# Patient Record
Sex: MALE | Race: WHITE
[De-identification: names, ages, dates, MRNs, and addresses within clinical notes are randomized per-mention and may not be internally consistent; named-entity substitution may affect disease eponyms.]

---

## 2019-04-13 ENCOUNTER — HOSPITAL ENCOUNTER (EMERGENCY)
Dept: HOSPITAL 10 - E/R | Age: 64
Discharge: HOME | End: 2019-04-13
Payer: COMMERCIAL

## 2019-04-13 ENCOUNTER — HOSPITAL ENCOUNTER (EMERGENCY)
Dept: HOSPITAL 91 - E/R | Age: 64
Discharge: HOME | End: 2019-04-13
Payer: COMMERCIAL

## 2019-04-13 VITALS — SYSTOLIC BLOOD PRESSURE: 156 MMHG | DIASTOLIC BLOOD PRESSURE: 87 MMHG | HEART RATE: 78 BPM | RESPIRATION RATE: 18 BRPM

## 2019-04-13 VITALS
WEIGHT: 161.16 LBS | WEIGHT: 161.16 LBS | HEIGHT: 65 IN | HEIGHT: 65 IN | BODY MASS INDEX: 26.85 KG/M2 | BODY MASS INDEX: 26.85 KG/M2

## 2019-04-13 DIAGNOSIS — G44.209: Primary | ICD-10-CM

## 2019-04-13 DIAGNOSIS — Z79.82: ICD-10-CM

## 2019-04-13 DIAGNOSIS — R19.7: ICD-10-CM

## 2019-04-13 LAB
ADD MAN DIFF?: NO
ANION GAP: 11 (ref 5–13)
BASOPHIL #: 0 10^3/UL (ref 0–0.1)
BASOPHILS %: 0.4 % (ref 0–2)
BLOOD UREA NITROGEN: 16 MG/DL (ref 7–20)
CALCIUM: 9.2 MG/DL (ref 8.4–10.2)
CARBON DIOXIDE: 24 MMOL/L (ref 21–31)
CHLORIDE: 102 MMOL/L (ref 97–110)
CREATININE: 0.73 MG/DL (ref 0.61–1.24)
EOSINOPHILS #: 0.1 10^3/UL (ref 0–0.5)
EOSINOPHILS %: 1.6 % (ref 0–7)
GLUCOSE: 115 MG/DL (ref 70–220)
HEMATOCRIT: 41.6 % (ref 42–52)
HEMOGLOBIN: 14.2 G/DL (ref 14–18)
IMMATURE GRANS #M: 0.01 10^3/UL (ref 0–0.03)
IMMATURE GRANS % (M): 0.2 % (ref 0–0.43)
LYMPHOCYTES #: 0.7 10^3/UL (ref 0.8–2.9)
LYMPHOCYTES %: 14.7 % (ref 15–51)
MEAN CORPUSCULAR HEMOGLOBIN: 29.7 PG (ref 29–33)
MEAN CORPUSCULAR HGB CONC: 34.1 G/DL (ref 32–37)
MEAN CORPUSCULAR VOLUME: 87 FL (ref 82–101)
MEAN PLATELET VOLUME: 9.3 FL (ref 7.4–10.4)
MONOCYTE #: 0.5 10^3/UL (ref 0.3–0.9)
MONOCYTES %: 9.7 % (ref 0–11)
NEUTROPHIL #: 3.7 10^3/UL (ref 1.6–7.5)
NEUTROPHILS %: 73.4 % (ref 39–77)
NUCLEATED RED BLOOD CELLS #: 0 10^3/UL (ref 0–0)
NUCLEATED RED BLOOD CELLS%: 0 /100WBC (ref 0–0)
PLATELET COUNT: 196 10^3/UL (ref 140–415)
POTASSIUM: 3.9 MMOL/L (ref 3.5–5.1)
RED BLOOD COUNT: 4.78 10^6/UL (ref 4.7–6.1)
RED CELL DISTRIBUTION WIDTH: 13 % (ref 11.5–14.5)
SODIUM: 137 MMOL/L (ref 135–144)
WHITE BLOOD COUNT: 5 10^3/UL (ref 4.8–10.8)

## 2019-04-13 PROCEDURE — 99284 EMERGENCY DEPT VISIT MOD MDM: CPT

## 2019-04-13 PROCEDURE — 96374 THER/PROPH/DIAG INJ IV PUSH: CPT

## 2019-04-13 PROCEDURE — 80048 BASIC METABOLIC PNL TOTAL CA: CPT

## 2019-04-13 PROCEDURE — 85025 COMPLETE CBC W/AUTO DIFF WBC: CPT

## 2019-04-13 PROCEDURE — 96361 HYDRATE IV INFUSION ADD-ON: CPT

## 2019-04-13 RX ADMIN — THIAMINE HYDROCHLORIDE 1 MLS/HR: 100 INJECTION, SOLUTION INTRAMUSCULAR; INTRAVENOUS at 16:12

## 2019-04-13 RX ADMIN — KETOROLAC TROMETHAMINE 1 MG: 15 INJECTION, SOLUTION INTRAMUSCULAR; INTRAVENOUS at 16:12

## 2019-04-13 NOTE — ERD
ER Documentation


Chief Complaint


Chief Complaint





BACK OF HEAD PAIN X 2 DAYS. ALSO C/O DIARRHEA. HTN IN TRIAGE





HPI


63-year-old gentleman who presents to the emergency room with a myriad of 


different complaints over approximately 2-week timeframe.  He describes 


intermittent loose watery stools, he has a gradual onset bandlike throbbing 


headache.  He has not been taking any Tylenol or Motrin.  He denies any chest 


pain or shortness of breath, no exertional symptoms.  Blood pressure was 


elevated in triage.





During the patient's encounter translation services were utilized


Language: Bhut[place]


Source: In person





ROS


All systems reviewed and are negative except as per history of present illness.





Medications


Home Meds


Reported Medications


Aspirin* (Aspirin* EC) 81 Mg Tablet.dr, 81 MG PO DAILY, TAB


   4/13/19


Atorvastatin Calcium* (Atorvastatin Calcium*) 20 Mg Tablet, 20 MG PO QHS, #30 


TAB


   4/13/19


Trazodone Hcl* (Trazodone Hcl*) 50 Mg Tablet, 50 MG PO QHS, #30 TAB


   4/13/19


Discontinued Scripts


Hydrocodone Bit-Acetaminophen* (Norco*) 5-325 Mg Tab, 1 TAB PO Q6 PRN for PAIN, 


#7 TAB


   Prov:SHANE SCHAEFER DO         8/13/15


Naproxen* (Naprosyn*) 500 Mg Tablet, 500 MG PO BID PRN for PAIN AND/OR INFLAMMA


TION, #14 TAB


   Prov:SHANE SCHAEFER DO         8/13/15





Allergies


Allergies:  


Coded Allergies:  


     No Known Allergy (Unverified , 4/13/19)





PMhx/Soc


History of Surgery:  No


Anesthesia Reaction:  No


Hx Neurological Disorder:  No


Hx Respiratory Disorders:  No


Hx Cardiac Disorders:  Yes (HIGH CHOLESTEROL )


Hx Psychiatric Problems:  No


Hx Miscellaneous Medical Probl:  No


Hx Alcohol Use:  No


Hx Substance Use:  No


Hx Tobacco Use:  No


Smoking Status:  Never smoker





FmHx


Family History:  No diabetes





Physical Exam


Vitals





Vital Signs


  Date      Temp  Pulse  Resp  B/P (MAP)   Pulse Ox  O2          O2 Flow    FiO2


Time                                                 Delivery    Rate


   4/13/19  98.1     78    18      156/87        99  Room Air


     18:00                          (110)


   4/13/19           76    18      152/91        99  Room Air


     16:08                          (111)


   4/13/19  98.6     78    18     180/101        99


     15:39                          (127)





Physical Exam


General: Well developed, well nourished, no acute distress


Head: Normocephalic, atraumatic.


Eyes: Pupils equally reactive, EOM intact


ENT: Moist mucous membranes


Neck: Supple, no lymphadenopathy


Respiratory: Lungs clear bilaterally, no distress


Cardiovascular: RRR, no murmurs, rubs, or gallops


Abdominal: Soft, non-tender, non-distended, no peritoneal signs


: Deferred


MSK: No edema, no unilateral swelling, 5/5 strength


Neurologic: Alert and oriented, moving all extremities, normal speech, no focal 


weakness, no cerebellar signs


Skin: No rash


Psych: Normal mood


Result Diagram:  


4/13/19 1607                                                                    


           4/13/19 1607





Results 24 hrs





Laboratory Tests


              Test
                                 4/13/19
16:07


              White Blood Count                      5.0 10^3/ul


              Red Blood Count                       4.78 10^6/ul


              Hemoglobin                               14.2 g/dl


              Hematocrit                                  41.6 %


              Mean Corpuscular Volume                    87.0 fl


              Mean Corpuscular Hemoglobin                29.7 pg


              Mean Corpuscular Hemoglobin
Concent     34.1 g/dl 



              Red Cell Distribution Width                 13.0 %


              Platelet Count                         196 10^3/UL


              Mean Platelet Volume                        9.3 fl


              Immature Granulocytes %                    0.200 %


              Neutrophils %                               73.4 %


              Lymphocytes %                               14.7 %


              Monocytes %                                  9.7 %


              Eosinophils %                                1.6 %


              Basophils %                                  0.4 %


              Nucleated Red Blood Cells %            0.0 /100WBC


              Immature Granulocytes #              0.010 10^3/ul


              Neutrophils #                          3.7 10^3/ul


              Lymphocytes #                          0.7 10^3/ul


              Monocytes #                            0.5 10^3/ul


              Eosinophils #                          0.1 10^3/ul


              Basophils #                            0.0 10^3/ul


              Nucleated Red Blood Cells #            0.0 10^3/ul


              Sodium Level                            137 mmol/L


              Potassium Level                         3.9 mmol/L


              Chloride Level                          102 mmol/L


              Carbon Dioxide Level                     24 mmol/L


              Anion Gap                                       11


              Blood Urea Nitrogen                       16 mg/dl


              Creatinine                              0.73 mg/dl


              Est Glomerular Filtrat Rate
mL/min   > 60 mL/min 



              Glucose Level                            115 mg/dl


              Calcium Level                            9.2 mg/dl





Current Medications


 Medications
   Dose
          Sig/Justin
       Start Time
   Status  Last


 (Trade)       Ordered        Route
 PRN     Stop Time              Admin
Dose


                              Reason                                Admin


 Sodium         1,000 ml @ 
   Q1H STAT
      4/13/19       DC           4/13/19


Chloride       1,000 mls/hr   IV
            16:02
                       16:12



                                             4/13/19 17:01


 Ketorolac
     15 mg          ONCE  STAT
    4/13/19       DC           4/13/19


Tromethamine
                 IV
            16:02
                       16:12



 (Toradol)                                   4/13/19 16:03








Procedures/MDM








LAB INTERPRETATION:


I reviewed the laboratory testing and it shows no evidence of acute process





MEDICAL DECISION MAKING:


The patient's headache is unlikely related to serious etiology. The patient does


not exhibit any clinical signs or symptoms, and has no risk factors to suggest 


headache etiology such as subarachnoid hemorrhage, acute vertebral or carotid 


dissection, intracranial mass, epidural, subdural hematoma, dural venous sinus 


thrombosis, giant cell arteritis, or pseudotumor cerebri.





The patient symptoms are very nonspecific possibly secondary to mild 


dehydration.  He exhibits no signs or symptoms concerning for clinically 


significant intracranial process.  I do not believe CT imaging of the head is 


necessary.  Patient has a benign abdominal exam.  No signs or symptoms 


concerning for cardiac etiology.





ER COURSE:


* Patient given IV fluids and continues to be well-appearing, laboratory testing


  is unremarkable.  The patient can be safely discharged home with symptom 


  control.  Patient does not have evidence of acute emergent medical condition.


* Repeat abdominal exam benign.





CONSULTATION:


None





DISPOSITION PLAN:


The patient does not have an identifiable emergent medical condition that 


warrants inpatient hospitalization at this time.  The patient is deemed safe for


discharge with outpatient follow-up.





We discussed follow up with the patient's primary care doctor within 24 to 48 


hours as needed.  We also discussed return to the emergency room for worsening 


symptoms or worsening condition.





Outpatient referral: None required





Departure


Diagnosis:  


   Primary Impression:  


   Diarrhea


   Diarrhea type:  unspecified type  Qualified Codes:  R19.7 - Diarrhea, unspeci


   fied


   Additional Impression:  


   Tension headache


Condition:  Stable











CUAUHTEMOC CANADA MD          Apr 13, 2019 16:29